# Patient Record
Sex: FEMALE | Race: WHITE | HISPANIC OR LATINO | Employment: PART TIME | URBAN - METROPOLITAN AREA
[De-identification: names, ages, dates, MRNs, and addresses within clinical notes are randomized per-mention and may not be internally consistent; named-entity substitution may affect disease eponyms.]

---

## 2017-01-10 ENCOUNTER — APPOINTMENT (OUTPATIENT)
Dept: LAB | Facility: CLINIC | Age: 47
End: 2017-01-10
Payer: COMMERCIAL

## 2017-01-10 ENCOUNTER — TRANSCRIBE ORDERS (OUTPATIENT)
Dept: LAB | Facility: CLINIC | Age: 47
End: 2017-01-10

## 2017-01-10 DIAGNOSIS — E03.9 UNSPECIFIED HYPOTHYROIDISM: ICD-10-CM

## 2017-01-10 DIAGNOSIS — E11.69 DIABETES MELLITUS ASSOCIATED WITH HORMONAL ETIOLOGY (HCC): Primary | ICD-10-CM

## 2017-01-10 LAB
ALBUMIN SERPL BCP-MCNC: 4.1 G/DL (ref 3.5–5)
ALP SERPL-CCNC: 89 U/L (ref 46–116)
ALT SERPL W P-5'-P-CCNC: 26 U/L (ref 12–78)
ANION GAP SERPL CALCULATED.3IONS-SCNC: 6 MMOL/L (ref 4–13)
AST SERPL W P-5'-P-CCNC: 14 U/L (ref 5–45)
BILIRUB SERPL-MCNC: 0.28 MG/DL (ref 0.2–1)
BUN SERPL-MCNC: 11 MG/DL (ref 5–25)
CALCIUM SERPL-MCNC: 10 MG/DL (ref 8.3–10.1)
CHLORIDE SERPL-SCNC: 101 MMOL/L (ref 100–108)
CHOLEST SERPL-MCNC: 290 MG/DL (ref 50–200)
CO2 SERPL-SCNC: 28 MMOL/L (ref 21–32)
CREAT SERPL-MCNC: 0.71 MG/DL (ref 0.6–1.3)
EST. AVERAGE GLUCOSE BLD GHB EST-MCNC: 174 MG/DL
GFR SERPL CREATININE-BSD FRML MDRD: >60 ML/MIN/1.73SQ M
GLUCOSE SERPL-MCNC: 123 MG/DL (ref 65–140)
HBA1C MFR BLD: 7.7 % (ref 4.2–6.3)
HDLC SERPL-MCNC: 57 MG/DL (ref 40–60)
LDLC SERPL CALC-MCNC: 197 MG/DL (ref 0–100)
POTASSIUM SERPL-SCNC: 4.4 MMOL/L (ref 3.5–5.3)
PROT SERPL-MCNC: 9 G/DL (ref 6.4–8.2)
SODIUM SERPL-SCNC: 135 MMOL/L (ref 136–145)
T4 FREE SERPL-MCNC: 0.83 NG/DL (ref 0.76–1.46)
TRIGL SERPL-MCNC: 178 MG/DL
TSH SERPL DL<=0.05 MIU/L-ACNC: 17.1 UIU/ML (ref 0.36–3.74)

## 2017-01-10 PROCEDURE — 80061 LIPID PANEL: CPT

## 2017-01-10 PROCEDURE — 84443 ASSAY THYROID STIM HORMONE: CPT

## 2017-01-10 PROCEDURE — 36415 COLL VENOUS BLD VENIPUNCTURE: CPT

## 2017-01-10 PROCEDURE — 84439 ASSAY OF FREE THYROXINE: CPT

## 2017-01-10 PROCEDURE — 83036 HEMOGLOBIN GLYCOSYLATED A1C: CPT

## 2017-01-10 PROCEDURE — 80053 COMPREHEN METABOLIC PANEL: CPT

## 2017-10-25 ENCOUNTER — GENERIC CONVERSION - ENCOUNTER (OUTPATIENT)
Dept: OTHER | Facility: OTHER | Age: 47
End: 2017-10-25

## 2018-01-11 NOTE — RESULT NOTES
Verified Results  (1) URINALYSIS w URINE C/S REFLEX (will reflex a microscopy if leukocytes, occult blood, or nitrites are not within normal limits) [id number] 09:17AM Margarette Scanlon Order Number: [id number]     Test Name Result Flag Reference   COLOR Yellow     CLARITY Cloudy     PH UA 6 0  4 5-8 0   LEUKOCYTE ESTERASE UA Moderate A Negative   NITRITE UA Negative  Negative   PROTEIN UA Negative mg/dl  Negative   GLUCOSE UA >=1000 (1%) mg/dl A Negative   KETONES UA Negative mg/dl  Negative   UROBILINOGEN UA 0 2 E U /dl  0 2, 1 0 E U /dl   BILIRUBIN UA Negative  Negative   BLOOD UA Negative  Negative   SPECIFIC GRAVITY UA 1 033 H 1 003-1 030   BACTERIA Occasional /hpf  None Seen, Occasional   EPITHELIAL CELLS Moderate /hpf A None Seen, Occasional   RBC UA 4-10 /hpf A None Seen   WBC UA 30-50 /hpf A None Seen   CLINICAL REPORT (Report)     Test:        Urine culture  Specimen Type:   Urine  Specimen Date:   12/16/2016 9:17 AM  Result Date:    12/17/2016 7:22 PM  Result Status:   Final result  Resulting Lab:   [address]            Tel: [phone]      CULTURE                                       ------------------                                   60,000-69,000 cfu/ml Mixed Contaminants X4

## 2018-01-13 NOTE — RESULT NOTES
Verified Results  (1) CBC/PLT/DIFF [id number] 09:17AM Glenny Silence Order Number: [id number]     Test Name Result Flag Reference   WBC COUNT 11 19 Thousand/uL H 4 31-10 16   RBC COUNT 4 79 Million/uL  3 81-5 12   HEMOGLOBIN 12 7 g/dL  11 5-15 4   HEMATOCRIT 40 9 %  34 8-46  1   MCV 85 fL  82-98   MCH 26 5 pg L 26 8-34 3   MCHC 31 1 g/dL L 31 4-37 4   RDW 13 3 %  11 6-15 1   MPV 9 4 fL  8 9-12 7   PLATELET COUNT 522 Thousands/uL H 149-390   nRBC AUTOMATED 0 /100 WBCs     NEUTROPHILS RELATIVE PERCENT 59 %  43-75   LYMPHOCYTES RELATIVE PERCENT 28 %  14-44   MONOCYTES RELATIVE PERCENT 8 %  4-12   EOSINOPHILS RELATIVE PERCENT 5 %  0-6   BASOPHILS RELATIVE PERCENT 0 %  0-1   NEUTROPHILS ABSOLUTE COUNT 6 62 Thousands/?L  1 85-7 62   LYMPHOCYTES ABSOLUTE COUNT 3 08 Thousands/?L  0 60-4 47   MONOCYTES ABSOLUTE COUNT 0 92 Thousand/?L  0 17-1 22   EOSINOPHILS ABSOLUTE COUNT 0 51 Thousand/?L  0 00-0 61   BASOPHILS ABSOLUTE COUNT 0 03 Thousands/?L  0 00-0 10   - Patient Instructions: This bloodwork is non-fasting  Please drink two glasses of water morning of bloodwork  - Patient Instructions: This bloodwork is non-fasting  Please drink two glasses of water morning of bloodwork  (1) COMPREHENSIVE METABOLIC PANEL [id number] 58:29VQ Glenny Silence Order Number: [id number]     Test Name Result Flag Reference   GLUCOSE,RANDM 125 mg/dL     If the patient is fasting, the ADA then defines impaired fasting glucose as > 100 mg/dL and diabetes as > or equal to 123 mg/dL     SODIUM 139 mmol/L  136-145   POTASSIUM 4 2 mmol/L  3 5-5 3   CHLORIDE 104 mmol/L  100-108   CARBON DIOXIDE 28 mmol/L  21-32   ANION GAP (CALC) 7 mmol/L  4-13   BLOOD UREA NITROGEN 11 mg/dL  5-25   CREATININE 0 67 mg/dL  0 60-1 30   Standardized to IDMS reference method   CALCIUM 9 3 mg/dL  8 3-10 1   BILI, TOTAL 0 32 mg/dL  0 20-1 00   ALK PHOSPHATAS 86 U/L     ALT (SGPT) 20 U/L  12-78   AST(SGOT) 9 U/L  5-45   ALBUMIN 3 8 g/dL  3 5-5 0   TOTAL PROTEIN 8 3 g/dL H 6 4-8 2   eGFR Non-African American      >60 0 ml/min/1 73sq m   - Patient Instructions: This is a fasting blood test  Water,black tea or black  coffee only after 9:00pm the night before test Drink 2 glasses of water the morning of test   National Kidney Disease Education Program recommendations are as follows:  GFR calculation is accurate only with a steady state creatinine  Chronic Kidney disease less than 60 ml/min/1 73 sq  meters  Kidney failure less than 15 ml/min/1 73 sq  meters  (1) LIPID PANEL, FASTING [id number] 09:17AM Fly Areli Order Number: [id number]     Test Name Result Flag Reference   CHOLESTEROL 274 mg/dL H    HDL,DIRECT 53 mg/dL  40-60   Specimen collection should occur prior to Metamizole administration due to the potential for falsely depressed results  LDL CHOLESTEROL CALCULATED 191 mg/dL H 0-100   - Patient Instructions: This is a fasting blood test  Water,black tea or black  coffee only after 9:00pm the night before test   Drink 2 glasses of water the morning of test     - Patient Instructions: This is a fasting blood test  Water,black tea or black  coffee only after 9:00pm the night before test Drink 2 glasses of water the morning of test   Triglyceride:         Normal              <150 mg/dl       Borderline High    150-199 mg/dl       High               200-499 mg/dl       Very High          >499 mg/dl  Cholesterol:         Desirable        <200 mg/dl      Borderline High  200-239 mg/dl      High             >239 mg/dl  HDL Cholesterol:        High    >59 mg/dL      Low     <41 mg/dL  LDL CALCULATED:    This screening LDL is a calculated result  It does not have the accuracy of the Direct Measured LDL in the monitoring of patients with hyperlipidemia and/or statin therapy  Direct Measure LDL (HWO261) must be ordered separately in these patients     TRIGLYCERIDES 151 mg/dL H <=150   Specimen collection should occur prior to N-Acetylcysteine or Metamizole administration due to the potential for falsely depressed results  (1) TSH WITH FT4 REFLEX [id number] 09:17AM Jaime Lund Order Number: [id number]     Test Name Result Flag Reference   TSH 1 950 uIU/mL  0 358-3 740   - Patient Instructions: This is a fasting blood test  Water,black tea or black  coffee only after 9:00pm the night before test Drink 2 glasses of water the morning of test   Patients undergoing fluorescein dye angiography may retain small amounts of fluorescein in the body for 48-72 hours post procedure  Samples containing fluorescein can produce falsely depressed TSH values  If the patient had this procedure,a specimen should be resubmitted post fluorescein clearance            The recommended reference ranges for TSH during pregnancy are as follows:  First trimester 0 1 to 2 5 uIU/mL  Second trimester  0 2 to 3 0 uIU/mL  Third trimester 0 3 to 3 0 uIU/m     (1) URINALYSIS w URINE C/S REFLEX (will reflex a microscopy if leukocytes, occult blood, or nitrites are not within normal limits) [id number] 09:17AM Jaime Lund Order Number: [id number]     Test Name Result Flag Reference   COLOR Yellow     CLARITY Cloudy     PH UA 6 0  4 5-8 0   LEUKOCYTE ESTERASE UA Moderate A Negative   NITRITE UA Negative  Negative   PROTEIN UA Negative mg/dl  Negative   GLUCOSE UA >=1000 (1%) mg/dl A Negative   KETONES UA Negative mg/dl  Negative   UROBILINOGEN UA 0 2 E U /dl  0 2, 1 0 E U /dl   BILIRUBIN UA Negative  Negative   BLOOD UA Negative  Negative   SPECIFIC GRAVITY UA 1 033 H 1 003-1 030   BACTERIA Occasional /hpf  None Seen, Occasional   EPITHELIAL CELLS Moderate /hpf A None Seen, Occasional   RBC UA 4-10 /hpf A None Seen   WBC UA 30-50 /hpf A None Seen

## 2018-01-15 NOTE — MISCELLANEOUS
Please contact our office at your convenience  It is important that we speak to you  REGARDING:   Porfavor contacte a  nuestra oficina  Es muy importante que hablemos con usted   SOBRE:          Scheduling an Appointment/ Programar david Josefina          Rescheduling  an Appointment/ Re-programar david Josefina     Cancelling an Appointment/Cancelar betancur Josefina     Your Lab/ X-ray Results/Resultados         Updating your Phone number or Address/ Actualizar  betancur [employer]          X Verify your Primary Providor/ Verificar betancur Proveedor primario     Other/Otro:    Please Contact Our Office to  Schedule Your Appointment  It is Very Important That You See Your Provider for your  Routine Medical Care  If you are seeing a New Providor,  Please Contact our Office so we can update our Records  Thank You  Comuníquese con nuestra oficina para programar betancur josefina  Es Muy Importante que usted barb betancur proveedor para betancur   [address] [facility] Floor de [place]  Si usted esta viendo un Proveedor  [place], [place] con [place] Fernando oficina para actualizar    nuestro registros  [place]

## 2021-08-06 ENCOUNTER — HOSPITAL ENCOUNTER (EMERGENCY)
Facility: HOSPITAL | Age: 51
Discharge: HOME | End: 2021-08-06
Attending: EMERGENCY MEDICINE | Admitting: EMERGENCY MEDICINE
Payer: OTHER GOVERNMENT

## 2021-08-06 ENCOUNTER — APPOINTMENT (EMERGENCY)
Dept: CT IMAGING | Facility: HOSPITAL | Age: 51
End: 2021-08-06
Payer: OTHER GOVERNMENT

## 2021-08-06 VITALS
OXYGEN SATURATION: 99 % | TEMPERATURE: 98.4 F | SYSTOLIC BLOOD PRESSURE: 129 MMHG | RESPIRATION RATE: 18 BRPM | HEART RATE: 66 BPM | DIASTOLIC BLOOD PRESSURE: 72 MMHG

## 2021-08-06 DIAGNOSIS — R11.0 NAUSEA: Primary | ICD-10-CM

## 2021-08-06 DIAGNOSIS — R10.84 GENERALIZED ABDOMINAL PAIN: ICD-10-CM

## 2021-08-06 LAB
ALBUMIN SERPL BCP-MCNC: 3.9 G/DL (ref 3.5–5)
ALP SERPL-CCNC: 76 U/L (ref 46–116)
ALT SERPL W P-5'-P-CCNC: 13 U/L (ref 12–78)
ANION GAP SERPL CALCULATED.3IONS-SCNC: 6 MMOL/L (ref 4–13)
AST SERPL W P-5'-P-CCNC: 11 U/L (ref 5–45)
BASOPHILS # BLD AUTO: 0.07 THOUSANDS/ΜL (ref 0–0.1)
BASOPHILS NFR BLD AUTO: 1 % (ref 0–1)
BILIRUB SERPL-MCNC: 0.38 MG/DL (ref 0.2–1)
BUN SERPL-MCNC: 12 MG/DL (ref 5–25)
CALCIUM SERPL-MCNC: 9.5 MG/DL (ref 8.3–10.1)
CHLORIDE SERPL-SCNC: 104 MMOL/L (ref 100–108)
CO2 SERPL-SCNC: 28 MMOL/L (ref 21–32)
CREAT SERPL-MCNC: 0.83 MG/DL (ref 0.6–1.3)
EOSINOPHIL # BLD AUTO: 0.62 THOUSAND/ΜL (ref 0–0.61)
EOSINOPHIL NFR BLD AUTO: 5 % (ref 0–6)
ERYTHROCYTE [DISTWIDTH] IN BLOOD BY AUTOMATED COUNT: 12.8 % (ref 11.6–15.1)
GFR SERPL CREATININE-BSD FRML MDRD: 81 ML/MIN/1.73SQ M
GLUCOSE SERPL-MCNC: 112 MG/DL (ref 65–140)
HCT VFR BLD AUTO: 41.8 % (ref 34.8–46.1)
HGB BLD-MCNC: 12.9 G/DL (ref 11.5–15.4)
IMM GRANULOCYTES # BLD AUTO: 0.05 THOUSAND/UL (ref 0–0.2)
IMM GRANULOCYTES NFR BLD AUTO: 0 % (ref 0–2)
LIPASE SERPL-CCNC: 79 U/L (ref 73–393)
LYMPHOCYTES # BLD AUTO: 3.56 THOUSANDS/ΜL (ref 0.6–4.47)
LYMPHOCYTES NFR BLD AUTO: 28 % (ref 14–44)
MCH RBC QN AUTO: 27.2 PG (ref 26.8–34.3)
MCHC RBC AUTO-ENTMCNC: 30.9 G/DL (ref 31.4–37.4)
MCV RBC AUTO: 88 FL (ref 82–98)
MONOCYTES # BLD AUTO: 1.01 THOUSAND/ΜL (ref 0.17–1.22)
MONOCYTES NFR BLD AUTO: 8 % (ref 4–12)
NEUTROPHILS # BLD AUTO: 7.45 THOUSANDS/ΜL (ref 1.85–7.62)
NEUTS SEG NFR BLD AUTO: 58 % (ref 43–75)
NRBC BLD AUTO-RTO: 0 /100 WBCS
PLATELET # BLD AUTO: 354 THOUSANDS/UL (ref 149–390)
PMV BLD AUTO: 8.3 FL (ref 8.9–12.7)
POTASSIUM SERPL-SCNC: 3.9 MMOL/L (ref 3.5–5.3)
PROT SERPL-MCNC: 7.9 G/DL (ref 6.4–8.2)
RBC # BLD AUTO: 4.75 MILLION/UL (ref 3.81–5.12)
SODIUM SERPL-SCNC: 138 MMOL/L (ref 136–145)
WBC # BLD AUTO: 12.76 THOUSAND/UL (ref 4.31–10.16)

## 2021-08-06 PROCEDURE — 85025 COMPLETE CBC W/AUTO DIFF WBC: CPT | Performed by: EMERGENCY MEDICINE

## 2021-08-06 PROCEDURE — 83690 ASSAY OF LIPASE: CPT | Performed by: EMERGENCY MEDICINE

## 2021-08-06 PROCEDURE — G1004 CDSM NDSC: HCPCS

## 2021-08-06 PROCEDURE — 99284 EMERGENCY DEPT VISIT MOD MDM: CPT | Performed by: EMERGENCY MEDICINE

## 2021-08-06 PROCEDURE — 99284 EMERGENCY DEPT VISIT MOD MDM: CPT

## 2021-08-06 PROCEDURE — 80053 COMPREHEN METABOLIC PANEL: CPT | Performed by: EMERGENCY MEDICINE

## 2021-08-06 PROCEDURE — 36415 COLL VENOUS BLD VENIPUNCTURE: CPT

## 2021-08-06 PROCEDURE — 74177 CT ABD & PELVIS W/CONTRAST: CPT

## 2021-08-06 PROCEDURE — 96374 THER/PROPH/DIAG INJ IV PUSH: CPT

## 2021-08-06 RX ORDER — ONDANSETRON 2 MG/ML
4 INJECTION INTRAMUSCULAR; INTRAVENOUS ONCE
Status: COMPLETED | OUTPATIENT
Start: 2021-08-06 | End: 2021-08-06

## 2021-08-06 RX ORDER — ONDANSETRON 4 MG/1
4 TABLET, ORALLY DISINTEGRATING ORAL EVERY 6 HOURS PRN
Qty: 20 TABLET | Refills: 0 | Status: SHIPPED | OUTPATIENT
Start: 2021-08-06

## 2021-08-06 RX ADMIN — ONDANSETRON 4 MG: 2 INJECTION INTRAMUSCULAR; INTRAVENOUS at 21:48

## 2021-08-06 RX ADMIN — IOHEXOL 100 ML: 350 INJECTION, SOLUTION INTRAVENOUS at 21:32

## 2021-08-06 NOTE — Clinical Note
Lazarus Bowers was seen and treated in our emergency department on 8/6/2021  Diagnosis:     Brock Colon    She may return on this date: 08/08/2021         If you have any questions or concerns, please don't hesitate to call        Saida Villalta, DO    ______________________________           _______________          _______________  Hospital Representative                              Date                                Time

## 2025-01-08 ENCOUNTER — APPOINTMENT (OUTPATIENT)
Dept: URGENT CARE | Age: 55
End: 2025-01-08

## 2025-01-20 ENCOUNTER — TELEPHONE (OUTPATIENT)
Dept: PLASTIC SURGERY | Facility: CLINIC | Age: 55
End: 2025-01-20

## 2025-01-20 NOTE — TELEPHONE ENCOUNTER
Received call from Patient regarding a Upper Eyelid lift/Upper Bleph. Patient verbalized that she already did the visual test and was referred out to Dr. Juan C Segura outside network, however, her insurance referred her to us. We do not have referral on file.    [facility] [place]: Please have an MA call patient back. She verbalized that wants to schedule with us even though she started this process with Dr. Juan C Segura outside the network.

## 2025-01-20 NOTE — TELEPHONE ENCOUNTER
Called and scheduled patient for an upper bleph consult with Dr. Song. She stated that she had a VFS with the eye doctor and will bring report with to appointment.

## 2025-03-05 ENCOUNTER — TELEPHONE (OUTPATIENT)
Age: 55
End: 2025-03-05

## 2025-03-05 NOTE — TELEPHONE ENCOUNTER
LVM for pt regarding appt on 03/07 with Dr Song in [place]. Informed pt that we need to r/s this appt due to Dr Song going into surgery. Informed pt we will be canceling this appt for now but to call back so we can get her rescheduled for a new date and time.

## 2025-04-02 ENCOUNTER — CONSULT (OUTPATIENT)
Age: 55
End: 2025-04-02
Payer: OTHER GOVERNMENT

## 2025-04-02 VITALS
HEIGHT: 62 IN | DIASTOLIC BLOOD PRESSURE: 60 MMHG | BODY MASS INDEX: 25.58 KG/M2 | TEMPERATURE: 97.8 F | WEIGHT: 139 LBS | SYSTOLIC BLOOD PRESSURE: 100 MMHG

## 2025-04-02 DIAGNOSIS — H02.831 DERMATOCHALASIS OF BOTH UPPER EYELIDS: Primary | ICD-10-CM

## 2025-04-02 DIAGNOSIS — H02.834 DERMATOCHALASIS OF BOTH UPPER EYELIDS: Primary | ICD-10-CM

## 2025-04-02 PROCEDURE — 99244 OFF/OP CNSLTJ NEW/EST MOD 40: CPT | Performed by: STUDENT IN AN ORGANIZED HEALTH CARE EDUCATION/TRAINING PROGRAM

## 2025-04-02 NOTE — PROGRESS NOTES
Plastic Surgery Consult    Reason for visit: symptomatic upper eyelid dermatochalasis    HPI from 4/2/25  Patient is a pleasant 56 y/o female who presents with symptomatic upper eyelid dermatochalasis which she states obstructs her superior and lateral visual fields. She has obtained a visual field study that was scanned into chart which showed visual field obstruction. Patient does not use eye drops.     Of note, patient is chronic smoker. She states that she would stop 4-5 weeks prior to surgery date.    ROS: 12 pt ROS negative, except as otherwise noted in HPI    PMH: DM (last HgbA1C of 6.3)  FamHx: non-contrib  SurgHx: no facial surgery  SocHx: 1 PPD, +social ETOH  Meds: no blood thinners, no steroids.  Allergies: NKDA    PE:    Vitals:    04/02/25 0914   BP: 100/60   Temp: 97.8 °F (36.6 °C)       General: NC/AT, breathing comfortably on RA  Neuro: CN II-XII grossly intact, symmetric reflexes  HEENT: PERRLA, EOMI, external ears normal, no lesions or deformities, neck supple, trachea midline  Respiratory: CTAB, normal respiratory effort  Cardio: RRR, normal S1, S2, no murmur, rubs, gallops  GI: soft, non-tender, non-distended  Extremities/MSK: normal alignment, mobility, gait, no edema  Skin: no rashes, lesions, subcutaneous nodules    Bilateral severe upper eyelid dermatochalasis  Significantly worse on the right lateral hooding compared to left  No eyelid ptosis   Right MRD1: 3 mm  Left MRD1: 4 mm    Visual field testing: reviewed, scanned into chart.    A/P: 56 y/o female who presents with severe, significant bilateral upper eyelid dermatochalasis weighing down lid causing symptomatic visual obstruction confirmed by visual field testing. Worse on the right side in which excess skin is weighing down eyelid and obstructing vision.  -I discussed the surgery of upper blepharoplasty in detail. I discussed scarring and procedure itself. I discussed risks and complications including infection, bleeding, scarring,  asymmetry, temporary lagophthalmos. Patient acknowledged.  -All questions answered, concerns addressed.  -Photos taken, will submit to insurance  -Re-iterated must be nicotine free (no smoking, chew, patches) for 4-5 weeks prior to surgery. Patient acknowledged.    -Spent 40 minutes in consultation with patient. Greater than 50% of the total time was spent obtaining history, evaluation, performing exam, discussion of management options including post-operative care, answering patient's questions and concerns, chart reviewing, and documentation    Luisito Song MD   [facility]   [address] [place] [address], [address]   [place] [address]   Office: [phone]

## 2025-04-09 ENCOUNTER — TELEPHONE (OUTPATIENT)
Dept: PLASTIC SURGERY | Facility: CLINIC | Age: 55
End: 2025-04-09

## 2025-04-09 NOTE — TELEPHONE ENCOUNTER
LVM to inform pt that her insurance is asking that we have a referral from her PCP - as of right now we have no referrals and the insurance will no longer accept what we have sent in.       If pt calls back please just inform her that we need her to contact her PCP and have them send us a referral for upper eyelid bleph - just let her know that without this referral we will not be able to continue going through insurance.

## 2025-06-06 ENCOUNTER — APPOINTMENT (OUTPATIENT)
Dept: URGENT CARE | Age: 55
End: 2025-06-06